# Patient Record
Sex: FEMALE | Race: WHITE | NOT HISPANIC OR LATINO | Employment: FULL TIME | URBAN - METROPOLITAN AREA
[De-identification: names, ages, dates, MRNs, and addresses within clinical notes are randomized per-mention and may not be internally consistent; named-entity substitution may affect disease eponyms.]

---

## 2017-02-01 ENCOUNTER — RECORDS - HEALTHEAST (OUTPATIENT)
Dept: LAB | Facility: CLINIC | Age: 42
End: 2017-02-01

## 2017-02-01 LAB
CHOLEST SERPL-MCNC: 238 MG/DL
FASTING STATUS PATIENT QL REPORTED: ABNORMAL
HDLC SERPL-MCNC: 43 MG/DL
LDLC SERPL CALC-MCNC: 171 MG/DL
TRIGL SERPL-MCNC: 119 MG/DL

## 2017-02-07 LAB
BKR LAB AP ABNORMAL BLEEDING: NO
BKR LAB AP BIRTH CONTROL/HORMONES: NORMAL
BKR LAB AP CERVICAL APPEARANCE: NORMAL
BKR LAB AP GYN ADEQUACY: NORMAL
BKR LAB AP GYN INTERPRETATION: NORMAL
BKR LAB AP HPV REFLEX: NORMAL
BKR LAB AP LMP: NORMAL
BKR LAB AP PATIENT STATUS: NORMAL
BKR LAB AP PREVIOUS ABNORMAL: NORMAL
BKR LAB AP PREVIOUS NORMAL: 2010
HIGH RISK?: NO
PATH REPORT.COMMENTS IMP SPEC: NORMAL
RESULT FLAG (HE HISTORICAL CONVERSION): NORMAL

## 2018-07-05 ENCOUNTER — RECORDS - HEALTHEAST (OUTPATIENT)
Dept: LAB | Facility: CLINIC | Age: 43
End: 2018-07-05

## 2018-07-05 LAB
ALBUMIN SERPL-MCNC: 4.3 G/DL (ref 3.5–5)
ALP SERPL-CCNC: 102 U/L (ref 45–120)
ALT SERPL W P-5'-P-CCNC: 49 U/L (ref 0–45)
ANION GAP SERPL CALCULATED.3IONS-SCNC: 11 MMOL/L (ref 5–18)
AST SERPL W P-5'-P-CCNC: 28 U/L (ref 0–40)
BILIRUB SERPL-MCNC: 0.5 MG/DL (ref 0–1)
BUN SERPL-MCNC: 12 MG/DL (ref 8–22)
CALCIUM SERPL-MCNC: 9.9 MG/DL (ref 8.5–10.5)
CHLORIDE BLD-SCNC: 106 MMOL/L (ref 98–107)
CHOLEST SERPL-MCNC: 262 MG/DL
CO2 SERPL-SCNC: 23 MMOL/L (ref 22–31)
CREAT SERPL-MCNC: 1 MG/DL (ref 0.6–1.1)
FASTING STATUS PATIENT QL REPORTED: NO
GFR SERPL CREATININE-BSD FRML MDRD: >60 ML/MIN/1.73M2
GLUCOSE BLD-MCNC: 99 MG/DL (ref 70–125)
HDLC SERPL-MCNC: 50 MG/DL
LDLC SERPL CALC-MCNC: 189 MG/DL
POTASSIUM BLD-SCNC: 4.2 MMOL/L (ref 3.5–5)
PROT SERPL-MCNC: 7.6 G/DL (ref 6–8)
SODIUM SERPL-SCNC: 140 MMOL/L (ref 136–145)
TRIGL SERPL-MCNC: 113 MG/DL

## 2019-02-14 ENCOUNTER — RECORDS - HEALTHEAST (OUTPATIENT)
Dept: LAB | Facility: CLINIC | Age: 44
End: 2019-02-14

## 2019-02-14 LAB — INR PPP: 0.93 (ref 0.9–1.1)

## 2020-10-23 ENCOUNTER — RECORDS - HEALTHEAST (OUTPATIENT)
Dept: LAB | Facility: CLINIC | Age: 45
End: 2020-10-23

## 2020-10-23 LAB
ALBUMIN SERPL-MCNC: 3.8 G/DL (ref 3.5–5)
ALP SERPL-CCNC: 76 U/L (ref 45–120)
ALT SERPL W P-5'-P-CCNC: 40 U/L (ref 0–45)
ANION GAP SERPL CALCULATED.3IONS-SCNC: 10 MMOL/L (ref 5–18)
AST SERPL W P-5'-P-CCNC: 21 U/L (ref 0–40)
BILIRUB SERPL-MCNC: 0.2 MG/DL (ref 0–1)
BUN SERPL-MCNC: 17 MG/DL (ref 8–22)
CALCIUM SERPL-MCNC: 8.9 MG/DL (ref 8.5–10.5)
CHLORIDE BLD-SCNC: 107 MMOL/L (ref 98–107)
CHOLEST SERPL-MCNC: 218 MG/DL
CO2 SERPL-SCNC: 22 MMOL/L (ref 22–31)
CREAT SERPL-MCNC: 1.13 MG/DL (ref 0.6–1.1)
FASTING STATUS PATIENT QL REPORTED: ABNORMAL
GFR SERPL CREATININE-BSD FRML MDRD: 52 ML/MIN/1.73M2
GLUCOSE BLD-MCNC: 109 MG/DL (ref 70–125)
HDLC SERPL-MCNC: 39 MG/DL
LDLC SERPL CALC-MCNC: 141 MG/DL
POTASSIUM BLD-SCNC: 4.4 MMOL/L (ref 3.5–5)
PROT SERPL-MCNC: 6.5 G/DL (ref 6–8)
SODIUM SERPL-SCNC: 139 MMOL/L (ref 136–145)
TRIGL SERPL-MCNC: 189 MG/DL

## 2021-05-03 ENCOUNTER — RECORDS - HEALTHEAST (OUTPATIENT)
Dept: LAB | Facility: CLINIC | Age: 46
End: 2021-05-03

## 2021-05-03 ENCOUNTER — TRANSFERRED RECORDS (OUTPATIENT)
Dept: HEALTH INFORMATION MANAGEMENT | Facility: CLINIC | Age: 46
End: 2021-05-03

## 2021-05-04 LAB
HBV SURFACE AB SERPL IA-ACNC: POSITIVE M[IU]/ML
RUBV IGG SERPL QL IA: POSITIVE

## 2021-05-05 ENCOUNTER — RECORDS - HEALTHEAST (OUTPATIENT)
Dept: LAB | Facility: CLINIC | Age: 46
End: 2021-05-05

## 2021-05-05 LAB
MEV IGG SER IA-ACNC: NEGATIVE
VZV IGG SER QL IA: POSITIVE

## 2021-05-29 ENCOUNTER — RECORDS - HEALTHEAST (OUTPATIENT)
Dept: ADMINISTRATIVE | Facility: CLINIC | Age: 46
End: 2021-05-29

## 2022-06-03 ENCOUNTER — TRANSFERRED RECORDS (OUTPATIENT)
Dept: HEALTH INFORMATION MANAGEMENT | Facility: CLINIC | Age: 47
End: 2022-06-03

## 2022-06-03 ENCOUNTER — LAB REQUISITION (OUTPATIENT)
Dept: LAB | Facility: CLINIC | Age: 47
End: 2022-06-03

## 2022-06-03 DIAGNOSIS — E78.00 PURE HYPERCHOLESTEROLEMIA, UNSPECIFIED: ICD-10-CM

## 2022-06-03 LAB
ALBUMIN SERPL-MCNC: 3.8 G/DL (ref 3.5–5)
ALP SERPL-CCNC: 79 U/L (ref 45–120)
ALT SERPL W P-5'-P-CCNC: 28 U/L (ref 0–45)
ANION GAP SERPL CALCULATED.3IONS-SCNC: 10 MMOL/L (ref 5–18)
AST SERPL W P-5'-P-CCNC: 18 U/L (ref 0–40)
BILIRUB SERPL-MCNC: 0.3 MG/DL (ref 0–1)
BUN SERPL-MCNC: 17 MG/DL (ref 8–22)
CALCIUM SERPL-MCNC: 9.6 MG/DL (ref 8.5–10.5)
CHLORIDE BLD-SCNC: 109 MMOL/L (ref 98–107)
CO2 SERPL-SCNC: 22 MMOL/L (ref 22–31)
CREAT SERPL-MCNC: 0.91 MG/DL (ref 0.6–1.1)
GFR SERPL CREATININE-BSD FRML MDRD: 79 ML/MIN/1.73M2
GLUCOSE BLD-MCNC: 91 MG/DL (ref 70–125)
POTASSIUM BLD-SCNC: 4.4 MMOL/L (ref 3.5–5)
PROT SERPL-MCNC: 6.8 G/DL (ref 6–8)
SODIUM SERPL-SCNC: 141 MMOL/L (ref 136–145)
TSH SERPL DL<=0.005 MIU/L-ACNC: 1.25 UIU/ML (ref 0.3–5)

## 2022-06-03 PROCEDURE — 80053 COMPREHEN METABOLIC PANEL: CPT | Performed by: FAMILY MEDICINE

## 2022-06-03 PROCEDURE — 84443 ASSAY THYROID STIM HORMONE: CPT | Performed by: FAMILY MEDICINE

## 2023-02-27 ENCOUNTER — TRANSFERRED RECORDS (OUTPATIENT)
Dept: HEALTH INFORMATION MANAGEMENT | Facility: CLINIC | Age: 48
End: 2023-02-27

## 2023-04-22 ENCOUNTER — HEALTH MAINTENANCE LETTER (OUTPATIENT)
Age: 48
End: 2023-04-22

## 2023-05-16 ENCOUNTER — MEDICAL CORRESPONDENCE (OUTPATIENT)
Dept: HEALTH INFORMATION MANAGEMENT | Facility: CLINIC | Age: 48
End: 2023-05-16
Payer: COMMERCIAL

## 2023-11-26 NOTE — PROGRESS NOTES
"  Rheumatology [facility] Clinic Consult Note-Fellow    Martha Urbano MRN# [id number]   Age: 48 year old YOB: 1975       Reason for consult: \"psoriatic arthritis\"    Assessment & Recommendations:     ASSESSMENT:  Martha Urbano is a 48 year old female with a history of chronic joint pain, recurrent iritis, chronic dry eye, chronic dry mouth, possible psoriasis, situtational depression/anxiety, ADD, HTN, and HLD who presents for rheumatologic evaluation. She describes decades of slowly worsening daily hand and foot pain/stiffness along with some intermittent swelling of the fingers (does not seem clearly consistent with dactylitis however, per description, no photos available) along with intermittent bilateral knee pain and swelling during the last six months. She has previously been seen by other rheumatologists and been diagnosed with psoriatic arthritis at one point and fibromyalgia without any evidence of synovitis at another. She has trialed multiple daily NSAIDs (naproxen, diclofenac PO, ibuprofen) which did help her symptoms but caused significant GI upset. Her symptoms do respond very well to prednisone. She did not note improvement on Plaquenil, and noted about 50% improvement on Otezla. She has previously had a negative RF, CCP, KIAH, SSA, and SSB in 2018. Inflammatory markers and other basic labs were also essentially normal at this time.     On exam today, no clear synovitis was noted but some mild tightness with associated tenderness noted over scattered MCPs, DIPs, PIPs, and MTPs. Knees normal today. Some tenderness to palpation of the SI region, but Fabers negative. Tenderness also noted to the midback and trochanteric areas without clear restriction in range of motion.     Differential for this patient includes psoriatic arthritis given description of skin symptoms and distribution of joint symptoms, less likely a seronegative RA. Fibromyalgia or other amplified pain is also a " consideration. Given her chronic sicca symptoms a seronegative Sjogren's with associated arthropathy is also possible. She has no symptoms concerning for IBD and had a normal colonoscopy within the last few years. No other alarm symptoms concerning for malignancy. Time course and clinical scenario would not be consistent with an infectious or reactive process. Less likely OA given very young onset of symptoms and pattern of pain.    For further evaluation of these issues, we will assess basic labs today with hepatic panel, CBC with differential, creatinine, urinalysis, and inflammatory markers. I will also check an HLA-B27 given her report of recurrent iritis. Lastly will check pre-immunomodulatory therapy infectious screens (hep B, hep C, TB). X-rays of the hands and feet will be updated. If hand x-rays are normal, I will proceed with MRI of the right hand to assess for non-erosive inflammatory MSK changes such as enthesitis and tendonitis.     Additionally, I am referring to ophthalmology given her recurrent iritis for a full evaluation for inflammatory eye disease. Their assessment and recommendations for chronic dry eye would also be appreciated.    We will old off on starting any new medications pending the above workup. She already has a full regimen in place for management of her sicca symptoms, and has previously trialed multiple sialogogues, so will not write for these today.     PROBLEM LIST:  Chronic pain of multiple joints without clear synovitis today  Chronic sicca symptoms  History of recurrent iritis  History of possible psoriasis    RECOMMENDATIONS:  - Labs today, as above  - X-rays of hands and feet today, as above  - Referral to ophthalmology  - Will follow-up with patient via phone when results are available to discuss next steps    The patient was seen and discussed with Dr. Starr who agrees with above assessment and plan.     Alison M. Lerman, MD  Adult and Pediatric Rheumatology  "Fellow    HPI     Martha Urbano is a 48 year old female with a history of chronic joint pain, recurrent iritis, chronic dry eye, chronic dry mouth, possible psoriasis, situtational depression/anxiety, ADD, HTN, and HLD who presents for rheumatologic evaluation.    Referred 5/16/2023 for \"psoriatic arthritis\" from [facility]. Martha's main concern is pain in her hands and feet that occurs daily, worst the morning and improving with stretching/movement, but continues to be present to some degree throughout the whole day. She feels like her hands are more swollen that they \"should be\", always fingers 2-4 and sometimes thumbs, never 5th digit. The pain is focused on the MCP and PIP area, but some DIPs are also painful at times. Gets pain but not swelling in her toes, following the same pattern of pain. Rarely she will have pain in her hips, back, and knees. She has gotten intermittent knee swelling and pain in the last six months which is new. Knee swelling and pain will last 1-2 weeks. For symptom control she will take aspirin a couple times a day which she feels is more effective than other oral pain medications. NSAIDs cause stomach upset, particularly Aleve though also ibuprofen if she uses this for extended periods. She has tried oral diclofenac and Celebrex before and had significant side effects. She has taken prednisone recently from an old prescription (15mg once a day for three days) which significantly decreased an episode of knee pain and swelling and also made hands feel better. Topical Voltaren has not been helpful for her symptoms. She has a daily baseline of pain that is always present but will have more severe upticks in her pain every 4-6 months. Stress is a trigger, as is red meat and gluten.     She has been seen by multiple local rheumatologists in the past including Dr. Donte Stephenson (last in 2018) and Dr. Zachary Ann. Many years ago she was on Otezla which helped her feel about 50% " "better but caused gastritis (seen on EGD, improved after medication stopped per repeat EGD). She trialed hydroxychloroquine also but did not notice any benefit and had GI upset. She also reports being seen by a rheumatologist at age 12 in [place] for her pain symptoms but that no medications or other therapies were started at that time. She has had some degree of persistent pain since her 20's. It has gotten worse in last 10 years and much worse in last five years. Some providers diagnosed psoriatic arthritis but others felt this may be more consistent with fibromyalgia. She has tried Cymbalta which helped with her pain but made her feel \"foggy and flat.\" She also tried amitriptyline which caused aphasia.     She notes that she likely has psoriasis though has not been formally diagnosed. Her son has psoriasis and her daughter has psoriatic arthritis and follows with Dr. Sepulveda of pediatric rheumatology. She will get plaques on the back of her neck, behind her ears, and in her ears as well as tiny red patches on her arms. She will use clobetasol which will resolve the lesions when the arise, which is perhaps a few times a year. No noted nail pitting or oil spots.     She has had multiple episodes of iritis, last in 2021. She had two episodes in 2021 but she can go years without having any issues. The symptoms are always bilateral. They always resolve quickly with steroid drops. She sees Dr. Caden Vasquez at [facility]. She has has very dry eye and was told she might have Sjogren's. She tried restasis and got recurrent eye infections. She uses eye ointment nightly during the winter and eye gel during more temperate months. She uses PF artificial multiple times a day.     She does report dry mouth that she manages with copious water drinking and good dental hygiene. She has tried some sialogogues and did not find them helpful. No dysphagia.     GI symptoms seem focused around gastritis. Has had two EGDs showing " "gastritis, normal this year. Colonoscopy normal. No weight loss, bloody stools, abdominal pain.     Per Dr. Stephenson note in 2018:   Last seen in June 2015. She had a history of arthritis, and hadn't been seen (in 2015) since 2008. Had been on hydroxychloroquine in past. Ophthalmologist told her she had sjogrens. Had GI symptoms with hydroxychloroquine.   Since last seen, she thinks she has fibromyalgia diagnosis. Came in now, because her fingers are worse. Right is worse than left. Can't make good fist. Can't knit or needlepoint. There's pain and limitation of range of motion. Wakes up stiff and stays stiff all day. Takes tylenol for pain as needed. Has pain all over, but it's worse after she consumes alcohol. When she started cymbalta, her pain dropped by 1/2. Finds she's at her worst first thing in AM's. Doesn't drink alcohol. The pain all over is in 4 extremities and upper back. Doesn't like to be hugged by her kids, because it hurts.    At this visit, KIAH neg, SSA/SSB neg, RF/CCP neg. Inflammatory markers normal. No synovitis on exam. Dx fibromyalgia, recommended diclofenac and exercise.    Normal hand XR in 2015. Normal MR brain in 2014. C spine MR in 2014 showed some impingement from bone spur.       Patient denies weight loss, nasal or oral ulcers, respiratory symptoms, urinary symptoms. No history of blood clots.     HISTORY REVIEW:  Past Medical History:   Diagnosis Date    ADHD (attention deficit hyperactivity disorder)     Anxiety     Chronic joint pain     Depression     HTN (hypertension)     Hyperlipidemia      Past Surgical History:   Procedure Laterality Date    REMV/REVISN FULL ARM/LEG CAST Left 1985    SHOULDER DEBRIDEMENT Right     \"clean out\" and R biceps tendon moved per patient     Family History   Problem Relation Age of Onset    Hemochromatosis Brother     Arthritis Maternal Grandmother         Seronegative RA per patient    Arthritis Maternal Aunt         Seronegative RA per patient    " Psoriatic Arthritis Daughter     Psoriasis Son      Social History     Socioeconomic History    Marital status:      Spouse name: Not on file    Number of children: Not on file    Years of education: Not on file    Highest education level: Not on file   Occupational History    Not on file   Tobacco Use    Smoking status: Never    Smokeless tobacco: Never   Vaping Use    Vaping Use: Never used   Substance and Sexual Activity    Alcohol use: Not on file    Drug use: Not on file    Sexual activity: Not on file   Other Topics Concern    Not on file   Social History Narrative    Two teenaged children.  9 years. [facility] graduation 5/2024. No smoking or alcohol use.     Social Determinants of Health     Financial Resource Strain: Not on file   Food Insecurity: Not on file   Transportation Needs: Not on file   Physical Activity: Not on file   Stress: Not on file   Social Connections: Not on file   Interpersonal Safety: Not on file   Housing Stability: Not on file     Patient Active Problem List   Diagnosis    Depression    Anxiety     Allergies   Allergen Reactions    Amitriptyline      Can't form words      Shellfish-Derived Products      Current Outpatient Medications   Medication    amLODIPine (NORVASC) 5 MG tablet     No current facility-administered medications for this visit.       ROS     A 10 point ROS was performed with pertinent findings listed above.    Objective     PHYSICAL EXAM  /86 (BP Location: Right arm, Patient Position: Sitting, Cuff Size: Adult Large)   Pulse 100   Temp 97.7  F (36.5  C) (Oral)   Wt 89.4 kg (197 lb)   SpO2 100%   Wt Readings from Last 4 Encounters:   11/27/23 89.4 kg (197 lb)       Constitutional: Alert, NAD.  Eyes: EOMI, PERRL, no conjunctival injection or icterus.  ENT: Normal external ears, nose, lips, teeth, gums, throat; mucous membranes somewhat dry. Teeth with orthodontic hardware and bands.  Neck: No mass or thyroid  "enlargement.  Resp: Lungs clear to auscultation bilaterally.  CV: RRR, no murmurs, rubs or gallops.  GI: Non-distended  : Deferred  Lymph: No cervical, supraclavicular, or epitrochlear nodes  MS: All TMJ, neck, shoulder, elbow, wrist, MCP/PIP/DIP, spine, hip, knee, ankle, and foot MTP/IP joints were examined and found normal with the following exceptions:   - Hands: Tightness to passive flexion of digits 2-4 with associated mild tenderness. TTP of scattered MCP, DIP, and PIP without true synovitis or deformity. Full ROM. Able to make full fist.  - Back: TTP to bilateral SI region and midback. Philip normal. Forward flexion somewhat limited, reporting tightness in hamstrings as opposed to pain in back.  Skin: No nail pitting, alopecia, rash, nodules or other lesions.  Neuro: CN II-XII grossly intact. Sensation and tone grossly normal.   Extr: No edema, PP+2.  Psych: Normal judgement, orientation, memory, affect.    DATA:      BMP:  Recent Labs   Lab Test 06/03/22  1052 10/23/20  1648 07/05/18  0915    139 140   POTASSIUM 4.4 4.4 4.2   CHLORIDE 109* 107 106   CO2 22 22 23   ANIONGAP 10 10 11   GLC 91 109 99   BUN 17 17 12   CR 0.91 1.13* 1.00   GFRESTIMATED 79 52* >60   DANAY 9.6 8.9 9.9       LFT:  Recent Labs   Lab Test 06/03/22  1052 10/23/20  1648 07/05/18  0915   PROTTOTAL 6.8 6.5 7.6   ALBUMIN 3.8 3.8 4.3   BILITOTAL 0.3 0.2 0.5   ALKPHOS 79 76 102   AST 18 21 28   ALT 28 40 49*       No results found for: \"CKTOTAL\"  TSH   Date Value Ref Range Status   06/03/2022 1.25 0.30 - 5.00 uIU/mL Final       IMAGING: Reports reviewed in Epic.  "

## 2023-11-27 ENCOUNTER — LAB (OUTPATIENT)
Dept: LAB | Facility: CLINIC | Age: 48
End: 2023-11-27
Attending: STUDENT IN AN ORGANIZED HEALTH CARE EDUCATION/TRAINING PROGRAM
Payer: COMMERCIAL

## 2023-11-27 ENCOUNTER — HOSPITAL ENCOUNTER (OUTPATIENT)
Dept: GENERAL RADIOLOGY | Facility: CLINIC | Age: 48
Discharge: HOME | End: 2023-11-27
Attending: STUDENT IN AN ORGANIZED HEALTH CARE EDUCATION/TRAINING PROGRAM
Payer: COMMERCIAL

## 2023-11-27 ENCOUNTER — OFFICE VISIT (OUTPATIENT)
Dept: RHEUMATOLOGY | Facility: CLINIC | Age: 48
End: 2023-11-27
Attending: STUDENT IN AN ORGANIZED HEALTH CARE EDUCATION/TRAINING PROGRAM
Payer: COMMERCIAL

## 2023-11-27 VITALS
HEART RATE: 100 BPM | SYSTOLIC BLOOD PRESSURE: 134 MMHG | OXYGEN SATURATION: 100 % | DIASTOLIC BLOOD PRESSURE: 86 MMHG | TEMPERATURE: 97.7 F | WEIGHT: 197 LBS

## 2023-11-27 DIAGNOSIS — G89.29 CHRONIC PAIN OF MULTIPLE JOINTS: Primary | ICD-10-CM

## 2023-11-27 DIAGNOSIS — M25.50 CHRONIC PAIN OF MULTIPLE JOINTS: ICD-10-CM

## 2023-11-27 DIAGNOSIS — G89.29 CHRONIC PAIN OF MULTIPLE JOINTS: ICD-10-CM

## 2023-11-27 DIAGNOSIS — M25.50 CHRONIC PAIN OF MULTIPLE JOINTS: Primary | ICD-10-CM

## 2023-11-27 DIAGNOSIS — F32.89 OTHER DEPRESSION: ICD-10-CM

## 2023-11-27 DIAGNOSIS — F41.9 ANXIETY: ICD-10-CM

## 2023-11-27 PROBLEM — F32.A DEPRESSION: Status: ACTIVE | Noted: 2023-11-27

## 2023-11-27 LAB
ALBUMIN SERPL BCG-MCNC: 4.4 G/DL (ref 3.5–5.2)
ALBUMIN UR-MCNC: 20 MG/DL
ALP SERPL-CCNC: 80 U/L (ref 40–150)
ALT SERPL W P-5'-P-CCNC: 57 U/L (ref 0–50)
APPEARANCE UR: CLEAR
AST SERPL W P-5'-P-CCNC: 32 U/L (ref 0–45)
BACTERIA #/AREA URNS HPF: ABNORMAL /HPF
BASOPHILS # BLD AUTO: 0.1 10E3/UL (ref 0–0.2)
BASOPHILS NFR BLD AUTO: 1 %
BILIRUB DIRECT SERPL-MCNC: <0.2 MG/DL (ref 0–0.3)
BILIRUB SERPL-MCNC: 0.4 MG/DL
BILIRUB UR QL STRIP: NEGATIVE
COLOR UR AUTO: YELLOW
CREAT SERPL-MCNC: 0.98 MG/DL (ref 0.51–0.95)
CRP SERPL-MCNC: 5.03 MG/L
EGFRCR SERPLBLD CKD-EPI 2021: 71 ML/MIN/1.73M2
EOSINOPHIL # BLD AUTO: 0.2 10E3/UL (ref 0–0.7)
EOSINOPHIL NFR BLD AUTO: 2 %
ERYTHROCYTE [DISTWIDTH] IN BLOOD BY AUTOMATED COUNT: 12.3 % (ref 10–15)
ERYTHROCYTE [SEDIMENTATION RATE] IN BLOOD BY WESTERGREN METHOD: 10 MM/HR (ref 0–20)
GLUCOSE UR STRIP-MCNC: NEGATIVE MG/DL
HBV CORE AB SERPL QL IA: NONREACTIVE
HBV SURFACE AG SERPL QL IA: NONREACTIVE
HCT VFR BLD AUTO: 43.5 % (ref 35–47)
HCV AB SERPL QL IA: NONREACTIVE
HGB BLD-MCNC: 14.8 G/DL (ref 11.7–15.7)
HGB UR QL STRIP: NEGATIVE
IMM GRANULOCYTES # BLD: 0.1 10E3/UL
IMM GRANULOCYTES NFR BLD: 1 %
KETONES UR STRIP-MCNC: NEGATIVE MG/DL
LEUKOCYTE ESTERASE UR QL STRIP: ABNORMAL
LYMPHOCYTES # BLD AUTO: 1.8 10E3/UL (ref 0.8–5.3)
LYMPHOCYTES NFR BLD AUTO: 24 %
MCH RBC QN AUTO: 29.8 PG (ref 26.5–33)
MCHC RBC AUTO-ENTMCNC: 34 G/DL (ref 31.5–36.5)
MCV RBC AUTO: 88 FL (ref 78–100)
MONOCYTES # BLD AUTO: 0.7 10E3/UL (ref 0–1.3)
MONOCYTES NFR BLD AUTO: 9 %
MUCOUS THREADS #/AREA URNS LPF: PRESENT /LPF
NEUTROPHILS # BLD AUTO: 4.8 10E3/UL (ref 1.6–8.3)
NEUTROPHILS NFR BLD AUTO: 63 %
NITRATE UR QL: NEGATIVE
NRBC # BLD AUTO: 0 10E3/UL
NRBC BLD AUTO-RTO: 0 /100
PH UR STRIP: 5.5 [PH] (ref 5–7)
PLATELET # BLD AUTO: 368 10E3/UL (ref 150–450)
PROT SERPL-MCNC: 7.2 G/DL (ref 6.4–8.3)
RBC # BLD AUTO: 4.96 10E6/UL (ref 3.8–5.2)
RBC URINE: 2 /HPF
SP GR UR STRIP: 1.03 (ref 1–1.03)
SQUAMOUS EPITHELIAL: 4 /HPF
TRANSITIONAL EPI: <1 /HPF
UROBILINOGEN UR STRIP-MCNC: NORMAL MG/DL
WBC # BLD AUTO: 7.5 10E3/UL (ref 4–11)
WBC URINE: 2 /HPF

## 2023-11-27 PROCEDURE — 85652 RBC SED RATE AUTOMATED: CPT

## 2023-11-27 PROCEDURE — 73120 X-RAY EXAM OF HAND: CPT | Mod: 50

## 2023-11-27 PROCEDURE — 99213 OFFICE O/P EST LOW 20 MIN: CPT | Performed by: STUDENT IN AN ORGANIZED HEALTH CARE EDUCATION/TRAINING PROGRAM

## 2023-11-27 PROCEDURE — 87340 HEPATITIS B SURFACE AG IA: CPT

## 2023-11-27 PROCEDURE — 86803 HEPATITIS C AB TEST: CPT

## 2023-11-27 PROCEDURE — 73620 X-RAY EXAM OF FOOT: CPT | Mod: 50

## 2023-11-27 PROCEDURE — 81001 URINALYSIS AUTO W/SCOPE: CPT

## 2023-11-27 PROCEDURE — 81374 HLA I TYPING 1 ANTIGEN LR: CPT

## 2023-11-27 PROCEDURE — 86481 TB AG RESPONSE T-CELL SUSP: CPT

## 2023-11-27 PROCEDURE — 85014 HEMATOCRIT: CPT

## 2023-11-27 PROCEDURE — 86140 C-REACTIVE PROTEIN: CPT

## 2023-11-27 PROCEDURE — 82565 ASSAY OF CREATININE: CPT

## 2023-11-27 PROCEDURE — 36415 COLL VENOUS BLD VENIPUNCTURE: CPT

## 2023-11-27 PROCEDURE — 99205 OFFICE O/P NEW HI 60 MIN: CPT | Performed by: STUDENT IN AN ORGANIZED HEALTH CARE EDUCATION/TRAINING PROGRAM

## 2023-11-27 PROCEDURE — 86704 HEP B CORE ANTIBODY TOTAL: CPT

## 2023-11-27 PROCEDURE — 82040 ASSAY OF SERUM ALBUMIN: CPT

## 2023-11-27 RX ORDER — AMLODIPINE BESYLATE 5 MG/1
5 TABLET ORAL DAILY
COMMUNITY

## 2023-11-27 ASSESSMENT — PAIN SCALES - GENERAL: PAINLEVEL: MODERATE PAIN (4)

## 2023-11-27 NOTE — LETTER
"11/27/2023       RE: Martha Urbano  [address] [address]     Dear Colleague,    Thank you for referring your patient, Martha Urbano, to the [facility] RHEUMATOLOGY at [facility]. Please see a copy of my visit note below.      Rheumatology New Clinic Consult Note-Fellow    Martha Urbano MRN# [id number]   Age: 48 year old YOB: 1975       Reason for consult: \"psoriatic arthritis\"    Assessment & Recommendations:     ASSESSMENT:  Martha Urbano is a 48 year old female with a history of chronic joint pain, recurrent iritis, chronic dry eye, chronic dry mouth, possible psoriasis, situtational depression/anxiety, ADD, HTN, and HLD who presents for rheumatologic evaluation. She describes decades of slowly worsening daily hand and foot pain/stiffness along with some intermittent swelling of the fingers (does not seem clearly consistent with dactylitis however, per description, no photos available) along with intermittent bilateral knee pain and swelling during the last six months. She has previously been seen by other rheumatologists and been diagnosed with psoriatic arthritis at one point and fibromyalgia without any evidence of synovitis at another. She has trialed multiple daily NSAIDs (naproxen, diclofenac PO, ibuprofen) which did help her symptoms but caused significant GI upset. Her symptoms do respond very well to prednisone. She did not note improvement on Plaquenil, and noted about 50% improvement on Otezla. She has previously had a negative RF, CCP, KIAH, SSA, and SSB in 2018. Inflammatory markers and other basic labs were also essentially normal at this time.     On exam today, no clear synovitis was noted but some mild tightness with associated tenderness noted over scattered MCPs, DIPs, PIPs, and MTPs. Knees normal today. Some tenderness to palpation of the SI region, but Fabers negative. Tenderness also noted " to the midback and trochanteric areas without clear restriction in range of motion.     Differential for this patient includes psoriatic arthritis given description of skin symptoms and distribution of joint symptoms, less likely a seronegative RA. Fibromyalgia or other amplified pain is also a consideration. Given her chronic sicca symptoms a seronegative Sjogren's with associated arthropathy is also possible. She has no symptoms concerning for IBD and had a normal colonoscopy within the last few years. No other alarm symptoms concerning for malignancy. Time course and clinical scenario would not be consistent with an infectious or reactive process. Less likely OA given very young onset of symptoms and pattern of pain.    For further evaluation of these issues, we will assess basic labs today with hepatic panel, CBC with differential, creatinine, urinalysis, and inflammatory markers. I will also check an HLA-B27 given her report of recurrent iritis. Lastly will check pre-immunomodulatory therapy infectious screens (hep B, hep C, TB). X-rays of the hands and feet will be updated. If hand x-rays are normal, I will proceed with MRI of the right hand to assess for non-erosive inflammatory MSK changes such as enthesitis and tendonitis.     Additionally, I am referring to ophthalmology given her recurrent iritis for a full evaluation for inflammatory eye disease. Their assessment and recommendations for chronic dry eye would also be appreciated.    We will old off on starting any new medications pending the above workup. She already has a full regimen in place for management of her sicca symptoms, and has previously trialed multiple sialogogues, so will not write for these today.     PROBLEM LIST:  Chronic pain of multiple joints without clear synovitis today  Chronic sicca symptoms  History of recurrent iritis  History of possible psoriasis    RECOMMENDATIONS:  - Labs today, as above  - X-rays of hands and feet today,  "as above  - Referral to ophthalmology  - Will follow-up with patient via phone when results are available to discuss next steps    The patient was seen and discussed with Dr. Starr who agrees with above assessment and plan.     Alison M. Lerman, MD  Adult and Pediatric Rheumatology Fellow    HPI     Martha Urbano is a 48 year old female with a history of chronic joint pain, recurrent iritis, chronic dry eye, chronic dry mouth, possible psoriasis, situtational depression/anxiety, ADD, HTN, and HLD who presents for rheumatologic evaluation.    Referred 5/16/2023 for \"psoriatic arthritis\" from [facility]. Martha's main concern is pain in her hands and feet that occurs daily, worst the morning and improving with stretching/movement, but continues to be present to some degree throughout the whole day. She feels like her hands are more swollen that they \"should be\", always fingers 2-4 and sometimes thumbs, never 5th digit. The pain is focused on the MCP and PIP area, but some DIPs are also painful at times. Gets pain but not swelling in her toes, following the same pattern of pain. Rarely she will have pain in her hips, back, and knees. She has gotten intermittent knee swelling and pain in the last six months which is new. Knee swelling and pain will last 1-2 weeks. For symptom control she will take aspirin a couple times a day which she feels is more effective than other oral pain medications. NSAIDs cause stomach upset, particularly Aleve though also ibuprofen if she uses this for extended periods. She has tried oral diclofenac and Celebrex before and had significant side effects. She has taken prednisone recently from an old prescription (15mg once a day for three days) which significantly decreased an episode of knee pain and swelling and also made hands feel better. Topical Voltaren has not been helpful for her symptoms. She has a daily baseline of pain that is always present but will have more " "severe upticks in her pain every 4-6 months. Stress is a trigger, as is red meat and gluten.     She has been seen by multiple local rheumatologists in the past including Dr. Donte Stephenson (last in 2018) and Dr. Zachary Ann. Many years ago she was on Otezla which helped her feel about 50% better but caused gastritis (seen on EGD, improved after medication stopped per repeat EGD). She trialed hydroxychloroquine also but did not notice any benefit and had GI upset. She also reports being seen by a rheumatologist at age 12 in [place] for her pain symptoms but that no medications or other therapies were started at that time. She has had some degree of persistent pain since her 20's. It has gotten worse in last 10 years and much worse in last five years. Some providers diagnosed psoriatic arthritis but others felt this may be more consistent with fibromyalgia. She has tried Cymbalta which helped with her pain but made her feel \"foggy and flat.\" She also tried amitriptyline which caused aphasia.     She notes that she likely has psoriasis though has not been formally diagnosed. Her son has psoriasis and her daughter has psoriatic arthritis and follows with Dr. Sepulveda of pediatric rheumatology. She will get plaques on the back of her neck, behind her ears, and in her ears as well as tiny red patches on her arms. She will use clobetasol which will resolve the lesions when the arise, which is perhaps a few times a year. No noted nail pitting or oil spots.     She has had multiple episodes of iritis, last in 2021. She had two episodes in 2021 but she can go years without having any issues. The symptoms are always bilateral. They always resolve quickly with steroid drops. She sees Dr. Caden Vasquez at [facility]. She has has very dry eye and was told she might have Sjogren's. She tried restasis and got recurrent eye infections. She uses eye ointment nightly during the winter and eye gel during more temperate months. She uses " PF artificial multiple times a day.     She does report dry mouth that she manages with copious water drinking and good dental hygiene. She has tried some sialogogues and did not find them helpful. No dysphagia.     GI symptoms seem focused around gastritis. Has had two EGDs showing gastritis, normal this year. Colonoscopy normal. No weight loss, bloody stools, abdominal pain.     Per Dr. Stephenson note in 2018:   Last seen in June 2015. She had a history of arthritis, and hadn't been seen (in 2015) since 2008. Had been on hydroxychloroquine in past. Ophthalmologist told her she had sjogrens. Had GI symptoms with hydroxychloroquine.   Since last seen, she thinks she has fibromyalgia diagnosis. Came in now, because her fingers are worse. Right is worse than left. Can't make good fist. Can't knit or needlepoint. There's pain and limitation of range of motion. Wakes up stiff and stays stiff all day. Takes tylenol for pain as needed. Has pain all over, but it's worse after she consumes alcohol. When she started cymbalta, her pain dropped by 1/2. Finds she's at her worst first thing in AM's. Doesn't drink alcohol. The pain all over is in 4 extremities and upper back. Doesn't like to be hugged by her kids, because it hurts.    At this visit, KIAH neg, SSA/SSB neg, RF/CCP neg. Inflammatory markers normal. No synovitis on exam. Dx fibromyalgia, recommended diclofenac and exercise.    Normal hand XR in 2015. Normal MR brain in 2014. C spine MR in 2014 showed some impingement from bone spur.       Patient denies weight loss, nasal or oral ulcers, respiratory symptoms, urinary symptoms. No history of blood clots.     HISTORY REVIEW:  Past Medical History:   Diagnosis Date    ADHD (attention deficit hyperactivity disorder)     Anxiety     Chronic joint pain     Depression     HTN (hypertension)     Hyperlipidemia      Past Surgical History:   Procedure Laterality Date    REMV/REVISN FULL ARM/LEG CAST Left 1985    SHOULDER  "DEBRIDEMENT Right     \"clean out\" and R biceps tendon moved per patient     Family History   Problem Relation Age of Onset    Hemochromatosis Brother     Arthritis Maternal Grandmother         Seronegative RA per patient    Arthritis Maternal Aunt         Seronegative RA per patient    Psoriatic Arthritis Daughter     Psoriasis Son      Social History     Socioeconomic History    Marital status:      Spouse name: Not on file    Number of children: Not on file    Years of education: Not on file    Highest education level: Not on file   Occupational History    Not on file   Tobacco Use    Smoking status: Never    Smokeless tobacco: Never   Vaping Use    Vaping Use: Never used   Substance and Sexual Activity    Alcohol use: Not on file    Drug use: Not on file    Sexual activity: Not on file   Other Topics Concern    Not on file   Social History Narrative    Two teenaged children.  9 years. [facility] graduation 5/2024. No smoking or alcohol use.     Social Determinants of Health     Financial Resource Strain: Not on file   Food Insecurity: Not on file   Transportation Needs: Not on file   Physical Activity: Not on file   Stress: Not on file   Social Connections: Not on file   Interpersonal Safety: Not on file   Housing Stability: Not on file     Patient Active Problem List   Diagnosis    Depression    Anxiety     Allergies   Allergen Reactions    Amitriptyline      Can't form words      Shellfish-Derived Products      Current Outpatient Medications   Medication    amLODIPine (NORVASC) 5 MG tablet     No current facility-administered medications for this visit.       ROS     A 10 point ROS was performed with pertinent findings listed above.    Objective     PHYSICAL EXAM  /86 (BP Location: Right arm, Patient Position: Sitting, Cuff Size: Adult Large)   Pulse 100   Temp 97.7  F (36.5  C) (Oral)   Wt 89.4 kg (197 lb)   SpO2 100%   Wt Readings from Last 4 Encounters:   11/27/23 " "89.4 kg (197 lb)       Constitutional: Alert, NAD.  Eyes: EOMI, PERRL, no conjunctival injection or icterus.  ENT: Normal external ears, nose, lips, teeth, gums, throat; mucous membranes somewhat dry. Teeth with orthodontic hardware and bands.  Neck: No mass or thyroid enlargement.  Resp: Lungs clear to auscultation bilaterally.  CV: RRR, no murmurs, rubs or gallops.  GI: Non-distended  : Deferred  Lymph: No cervical, supraclavicular, or epitrochlear nodes  MS: All TMJ, neck, shoulder, elbow, wrist, MCP/PIP/DIP, spine, hip, knee, ankle, and foot MTP/IP joints were examined and found normal with the following exceptions:   - Hands: Tightness to passive flexion of digits 2-4 with associated mild tenderness. TTP of scattered MCP, DIP, and PIP without true synovitis or deformity. Full ROM. Able to make full fist.  - Back: TTP to bilateral SI region and midback. Philip normal. Forward flexion somewhat limited, reporting tightness in hamstrings as opposed to pain in back.  Skin: No nail pitting, alopecia, rash, nodules or other lesions.  Neuro: CN II-XII grossly intact. Sensation and tone grossly normal.   Extr: No edema, PP+2.  Psych: Normal judgement, orientation, memory, affect.    DATA:      BMP:  Recent Labs   Lab Test 06/03/22  1052 10/23/20  1648 07/05/18  0915    139 140   POTASSIUM 4.4 4.4 4.2   CHLORIDE 109* 107 106   CO2 22 22 23   ANIONGAP 10 10 11   GLC 91 109 99   BUN 17 17 12   CR 0.91 1.13* 1.00   GFRESTIMATED 79 52* >60   DANAY 9.6 8.9 9.9       LFT:  Recent Labs   Lab Test 06/03/22  1052 10/23/20  1648 07/05/18  0915   PROTTOTAL 6.8 6.5 7.6   ALBUMIN 3.8 3.8 4.3   BILITOTAL 0.3 0.2 0.5   ALKPHOS 79 76 102   AST 18 21 28   ALT 28 40 49*       No results found for: \"CKTOTAL\"  TSH   Date Value Ref Range Status   06/03/2022 1.25 0.30 - 5.00 uIU/mL Final       IMAGING: Reports reviewed in Epic.    Attestation signed by Ochoa Christensen MD at 11/28/2023 12:45 PM:  Staff addendum  I performed the " history and physical examination of the patient and discussed the management with the fellow. I reviewed the available lab and imaging studies. I reviewed the fellow's note and agree with the documented findings and plan of care.    In brief 49 yo with likely psoriasis, recurrent iritis, dry eye and mouth here with hand and foot pain concerning for psoriatic arthritis. Today she has no synovitis of hand joints but I agree with obtaining XRays initially but MRI if needed of hands to evaluate for tendon, ligament, and enthesial involvement. In addition, given her recurrent iritis, referral to ophthalmology and HLA B27 seem prudent and would alter treatment choices going forward.    Ochoa Christensen MD, PhD  Rheumatology

## 2023-11-27 NOTE — NURSING NOTE
Chief Complaint   Patient presents with    Consult   /86 (BP Location: Right arm, Patient Position: Sitting, Cuff Size: Adult Large)   Pulse 100   Temp 97.7  F (36.5  C) (Oral)   Wt 89.4 kg (197 lb)   SpO2 100% Digna Cantu on 11/27/2023 at 8:56 AM

## 2023-11-27 NOTE — PATIENT INSTRUCTIONS
- Labs today  - X-rays today  - Possible MRI in the future  - Ophthalmology referral for recurrent iritis

## 2023-11-28 LAB
GAMMA INTERFERON BACKGROUND BLD IA-ACNC: 0.01 IU/ML
M TB IFN-G BLD-IMP: NEGATIVE
M TB IFN-G CD4+ BCKGRND COR BLD-ACNC: 9.99 IU/ML
MITOGEN IGNF BCKGRD COR BLD-ACNC: 0 IU/ML
MITOGEN IGNF BCKGRD COR BLD-ACNC: 0.01 IU/ML
QUANTIFERON MITOGEN: 10 IU/ML
QUANTIFERON NIL TUBE: 0.01 IU/ML
QUANTIFERON TB1 TUBE: 0.01 IU/ML
QUANTIFERON TB2 TUBE: 0.02

## 2023-12-01 LAB
B LOCUS: NORMAL
B27TEST METHOD: NORMAL

## 2023-12-20 ENCOUNTER — ANCILLARY PROCEDURE (OUTPATIENT)
Dept: MRI IMAGING | Facility: CLINIC | Age: 48
End: 2023-12-20
Attending: STUDENT IN AN ORGANIZED HEALTH CARE EDUCATION/TRAINING PROGRAM
Payer: COMMERCIAL

## 2023-12-20 DIAGNOSIS — M25.50 CHRONIC PAIN OF MULTIPLE JOINTS: ICD-10-CM

## 2023-12-20 DIAGNOSIS — G89.29 CHRONIC PAIN OF MULTIPLE JOINTS: ICD-10-CM

## 2023-12-20 PROCEDURE — 73218 MRI UPPER EXTREMITY W/O DYE: CPT | Mod: RT

## 2024-01-02 ENCOUNTER — TELEPHONE (OUTPATIENT)
Dept: RHEUMATOLOGY | Facility: CLINIC | Age: 49
End: 2024-01-02
Payer: COMMERCIAL

## 2024-01-02 NOTE — TELEPHONE ENCOUNTER
Called patient, left message with Rheumatology phone number to call back.       Katherine Arevalo RN

## 2024-01-02 NOTE — TELEPHONE ENCOUNTER
Patient called back,  gave message per Dr. Lerman.  Patient verbalized understanding and agreement to plan.      Discussed symptoms and when to call the Rheumatology number back, she will keep it on hand.    Katherine Arevalo RN

## 2024-06-12 ENCOUNTER — HOSPITAL ENCOUNTER (OUTPATIENT)
Facility: CLINIC | Age: 49
End: 2024-06-12
Attending: INTERNAL MEDICINE | Admitting: INTERNAL MEDICINE
Payer: COMMERCIAL

## 2024-06-29 ENCOUNTER — HEALTH MAINTENANCE LETTER (OUTPATIENT)
Age: 49
End: 2024-06-29

## 2024-07-05 ENCOUNTER — LAB REQUISITION (OUTPATIENT)
Dept: LAB | Facility: CLINIC | Age: 49
End: 2024-07-05

## 2024-07-05 DIAGNOSIS — M85.80 OTHER SPECIFIED DISORDERS OF BONE DENSITY AND STRUCTURE, UNSPECIFIED SITE: ICD-10-CM

## 2024-07-05 LAB
PTH-INTACT SERPL-MCNC: 22 PG/ML (ref 15–65)
TSH SERPL DL<=0.005 MIU/L-ACNC: 0.03 UIU/ML (ref 0.3–4.2)
VIT D+METAB SERPL-MCNC: 19 NG/ML (ref 20–50)

## 2024-07-05 PROCEDURE — 84443 ASSAY THYROID STIM HORMONE: CPT | Performed by: FAMILY MEDICINE

## 2024-07-05 PROCEDURE — 82784 ASSAY IGA/IGD/IGG/IGM EACH: CPT | Performed by: FAMILY MEDICINE

## 2024-07-05 PROCEDURE — 83516 IMMUNOASSAY NONANTIBODY: CPT | Performed by: FAMILY MEDICINE

## 2024-07-05 PROCEDURE — 83970 ASSAY OF PARATHORMONE: CPT | Performed by: FAMILY MEDICINE

## 2024-07-05 PROCEDURE — 82306 VITAMIN D 25 HYDROXY: CPT | Performed by: FAMILY MEDICINE

## 2024-07-09 LAB
GLIADIN IGA SER-ACNC: 2.3 U/ML
GLIADIN IGG SER-ACNC: <0.6 U/ML
IGA SERPL-MCNC: 138 MG/DL (ref 84–499)
TTG IGA SER-ACNC: 0.6 U/ML
TTG IGG SER-ACNC: 0.7 U/ML

## 2024-07-25 ENCOUNTER — LAB REQUISITION (OUTPATIENT)
Dept: LAB | Facility: CLINIC | Age: 49
End: 2024-07-25

## 2024-07-25 DIAGNOSIS — I10 ESSENTIAL (PRIMARY) HYPERTENSION: ICD-10-CM

## 2024-07-25 PROCEDURE — 80048 BASIC METABOLIC PNL TOTAL CA: CPT | Performed by: FAMILY MEDICINE

## 2024-07-26 LAB
ANION GAP SERPL CALCULATED.3IONS-SCNC: 14 MMOL/L (ref 7–15)
BUN SERPL-MCNC: 21.1 MG/DL (ref 6–20)
CALCIUM SERPL-MCNC: 9.2 MG/DL (ref 8.8–10.4)
CHLORIDE SERPL-SCNC: 103 MMOL/L (ref 98–107)
CREAT SERPL-MCNC: 1.07 MG/DL (ref 0.51–0.95)
EGFRCR SERPLBLD CKD-EPI 2021: 64 ML/MIN/1.73M2
GLUCOSE SERPL-MCNC: 98 MG/DL (ref 70–99)
HCO3 SERPL-SCNC: 21 MMOL/L (ref 22–29)
POTASSIUM SERPL-SCNC: 4.6 MMOL/L (ref 3.4–5.3)
SODIUM SERPL-SCNC: 138 MMOL/L (ref 135–145)

## 2024-11-15 ENCOUNTER — LAB REQUISITION (OUTPATIENT)
Dept: LAB | Facility: CLINIC | Age: 49
End: 2024-11-15

## 2024-11-15 DIAGNOSIS — M85.80 OTHER SPECIFIED DISORDERS OF BONE DENSITY AND STRUCTURE, UNSPECIFIED SITE: ICD-10-CM

## 2024-11-15 DIAGNOSIS — I10 ESSENTIAL (PRIMARY) HYPERTENSION: ICD-10-CM

## 2024-11-15 DIAGNOSIS — R79.89 OTHER SPECIFIED ABNORMAL FINDINGS OF BLOOD CHEMISTRY: ICD-10-CM

## 2024-11-15 LAB
ANION GAP SERPL CALCULATED.3IONS-SCNC: 13 MMOL/L (ref 7–15)
BUN SERPL-MCNC: 13.4 MG/DL (ref 6–20)
CALCIUM SERPL-MCNC: 9.2 MG/DL (ref 8.8–10.4)
CHLORIDE SERPL-SCNC: 107 MMOL/L (ref 98–107)
CREAT SERPL-MCNC: 0.99 MG/DL (ref 0.51–0.95)
EGFRCR SERPLBLD CKD-EPI 2021: 70 ML/MIN/1.73M2
GLUCOSE SERPL-MCNC: 105 MG/DL (ref 70–99)
HCO3 SERPL-SCNC: 20 MMOL/L (ref 22–29)
POTASSIUM SERPL-SCNC: 4.3 MMOL/L (ref 3.4–5.3)
SODIUM SERPL-SCNC: 140 MMOL/L (ref 135–145)
TSH SERPL DL<=0.005 MIU/L-ACNC: 2.46 UIU/ML (ref 0.3–4.2)
VIT D+METAB SERPL-MCNC: 27 NG/ML (ref 20–50)

## 2024-11-15 PROCEDURE — 84443 ASSAY THYROID STIM HORMONE: CPT | Performed by: FAMILY MEDICINE

## 2024-11-15 PROCEDURE — 82306 VITAMIN D 25 HYDROXY: CPT | Performed by: FAMILY MEDICINE

## 2024-11-15 PROCEDURE — 80048 BASIC METABOLIC PNL TOTAL CA: CPT | Performed by: FAMILY MEDICINE

## 2024-11-22 ENCOUNTER — LAB REQUISITION (OUTPATIENT)
Dept: LAB | Facility: CLINIC | Age: 49
End: 2024-11-22
Payer: COMMERCIAL

## 2024-11-22 PROCEDURE — 88307 TISSUE EXAM BY PATHOLOGIST: CPT | Mod: 26 | Performed by: PATHOLOGY

## 2024-11-22 PROCEDURE — 88307 TISSUE EXAM BY PATHOLOGIST: CPT | Mod: TC,ORL | Performed by: OBSTETRICS & GYNECOLOGY

## 2024-11-27 LAB
PATH REPORT.COMMENTS IMP SPEC: NORMAL
PATH REPORT.COMMENTS IMP SPEC: NORMAL
PATH REPORT.FINAL DX SPEC: NORMAL
PATH REPORT.GROSS SPEC: NORMAL
PATH REPORT.MICROSCOPIC SPEC OTHER STN: NORMAL
PATH REPORT.RELEVANT HX SPEC: NORMAL
PHOTO IMAGE: NORMAL

## 2025-02-08 ENCOUNTER — HEALTH MAINTENANCE LETTER (OUTPATIENT)
Age: 50
End: 2025-02-08

## 2025-07-12 ENCOUNTER — HEALTH MAINTENANCE LETTER (OUTPATIENT)
Age: 50
End: 2025-07-12